# Patient Record
(demographics unavailable — no encounter records)

---

## 2025-01-22 NOTE — DISCUSSION/SUMMARY
[FreeTextEntry1] : The patient is an 87-year-old anxious female HTN, HLD s/p AWMI with EF recovered who is in better spirits. #1 CV- AWMI, c/w DAPT until April  #2 HTN- was on amlodipine preMI, c/w losartan 50mg, HCTZ 12.5mg (wants to remain separate) and c/w metoprolol 25mg #3 HLD- c/w atorvastatin 40mg, therapeutic #4 GI- f/u Dr. Dejesus  #5 General- lives alone, active  and still drives locally.Completed cardiac rehab. Will join gym now. All questions answered.  [EKG obtained to assist in diagnosis and management of assessed problem(s)] : EKG obtained to assist in diagnosis and management of assessed problem(s)

## 2025-01-22 NOTE — HISTORY OF PRESENT ILLNESS
[FreeTextEntry1] : Sandy went to Dr. Casper and labs forwarded. She wanted to review ECHO. Pushed the snow the other day. No CP, palpitations or SOB. HbA1c 6.0% She loved cardiac rehab.

## 2025-04-23 NOTE — HISTORY OF PRESENT ILLNESS
[FreeTextEntry1] :  87-year-old anxious female HTN, HLD s/p AWMI with EF recovered who may stop clopidogrel now. She is very relaxed today. Notes here. She is seeing Dr. Casper who does her labs there in 9/25.

## 2025-04-23 NOTE — DISCUSSION/SUMMARY
[FreeTextEntry1] : The patient is an 87-year-old anxious female HTN, HLD s/p AWMI with EF recovered who is asymptomatic. #1 CV- AWMI, c/w aspirin, stop clopidogrel #2 HTN- was on amlodipine preMI, c/w losartan 50mg, HCTZ 12.5mg (wants to remain separate) and c/w metoprolol 25mg #3 HLD- c/w atorvastatin 40mg, therapeutic #4 GI- f/u Dr. Dejesus  #5 General- lives alone, active  and still drives locally. Completed cardiac rehab. Will join gym now. All questions answered.  [EKG obtained to assist in diagnosis and management of assessed problem(s)] : EKG obtained to assist in diagnosis and management of assessed problem(s)